# Patient Record
Sex: FEMALE | Race: WHITE
[De-identification: names, ages, dates, MRNs, and addresses within clinical notes are randomized per-mention and may not be internally consistent; named-entity substitution may affect disease eponyms.]

---

## 2017-04-13 ENCOUNTER — HOSPITAL ENCOUNTER (OUTPATIENT)
Dept: HOSPITAL 58 - LAB | Age: 62
Discharge: HOME | End: 2017-04-13
Attending: INTERNAL MEDICINE

## 2017-04-13 VITALS — BODY MASS INDEX: 19.3 KG/M2

## 2017-04-13 DIAGNOSIS — I10: ICD-10-CM

## 2017-04-13 DIAGNOSIS — E11.9: ICD-10-CM

## 2017-04-13 DIAGNOSIS — J44.9: ICD-10-CM

## 2017-04-13 DIAGNOSIS — I50.9: Primary | ICD-10-CM

## 2017-04-13 LAB
ALBUMIN SERPL-MCNC: 4.1 G/DL (ref 3.4–5)
ALBUMIN/GLOB SERPL: 1.37 {RATIO}
ALP SERPL-CCNC: 42 U/L (ref 53–141)
ALT SERPL-CCNC: 18 U/L (ref 12–78)
ANION GAP SERPL CALC-SCNC: 16.7 MMOL/L
AST SERPL-CCNC: 18 U/L (ref 15–37)
BASOPHILS # BLD AUTO: 0.1 K/UL (ref 0–0.2)
BASOPHILS NFR BLD AUTO: 0.8 % (ref 0–3)
BILIRUB SERPL-MCNC: < 0.1 MG/DL (ref 0–1.2)
BUN SERPL-MCNC: 32 MG/DL (ref 7–18)
BUN/CREAT SERPL: 17.48
CALCIUM SERPL-MCNC: 10.3 MG/DL (ref 8.2–10.2)
CHLORIDE SERPL-SCNC: 99 MMOL/L (ref 98–107)
CHOLEST SERPL-MCNC: 206 MG/DL (ref 0–200)
CHOLEST/HDLC SERPL: 3.8 {RATIO} (ref 4.5–5.5)
CO2 BLD-SCNC: 26 MMOL/L (ref 23–31)
CREAT SERPL-MCNC: 1.83 MG/DL (ref 0.6–1.3)
DIGOXIN SERPL-MCNC: 2.02 NG/ML (ref 1–2)
EOSINOPHIL # BLD AUTO: 0.5 K/UL (ref 0–0.7)
EOSINOPHIL NFR BLD AUTO: 7.7 % (ref 0–7)
GFR SERPLBLD BASED ON 1.73 SQ M-ARVRAT: 28 ML/MIN
GLOBULIN SER CALC-MCNC: 3 G/L
GLUCOSE SERPL-MCNC: 83 MG/DL (ref 82–115)
HCT VFR BLD AUTO: 37.8 % (ref 37–47)
HDLC SERPL-MCNC: 54 MG/DL (ref 35–80)
HGB BLD-MCNC: 12.4 G/DL (ref 12–16)
IMM GRANULOCYTES NFR BLD AUTO: 0.3 % (ref 0–5)
LYMPHOCYTES # SPEC AUTO: 2 K/UL (ref 0.6–3.4)
LYMPHOCYTES NFR BLD AUTO: 31.2 % (ref 10–50)
MCH RBC QN: 31.2 PG (ref 27–31)
MCHC RBC AUTO-ENTMCNC: 32.8 G/DL (ref 31.8–35.4)
MCV RBC: 95.2 FL (ref 81–99)
MONOCYTES # BLD AUTO: 0.5 K/UL (ref 0.4–2)
MONOCYTES NFR BLD AUTO: 8.3 % (ref 0–10)
NEUTROPHILS # BLD AUTO: 3.2 K/UL (ref 2–6.9)
NEUTROPHILS NFR BLD AUTO: 51.7 %
PLATELET # BLD AUTO: 260 10^3/UL (ref 140–440)
POTASSIUM SERPL-SCNC: 4.7 MMOL/L (ref 3.5–5.1)
PROT SERPL-MCNC: 7.1 G/DL (ref 5.8–8.1)
RBC # BLD AUTO: 3.97 10^6/UL (ref 4.2–5.4)
SODIUM SERPL-SCNC: 137 MMOL/L (ref 136–145)
TRIGL SERPL-MCNC: 115 MG/DL (ref 30–150)
VITAMIN D25: 53 NG/ML (ref 20–50)
VLDLC SERPL CALC-MCNC: 23 MG/DL (ref 2–30)
WBC # BLD AUTO: 6.26 K/UL (ref 4.6–10.2)

## 2017-04-13 PROCEDURE — 82607 VITAMIN B-12: CPT

## 2017-04-13 PROCEDURE — 83036 HEMOGLOBIN GLYCOSYLATED A1C: CPT

## 2017-04-13 PROCEDURE — 85025 COMPLETE CBC W/AUTO DIFF WBC: CPT

## 2017-04-13 PROCEDURE — 36415 COLL VENOUS BLD VENIPUNCTURE: CPT

## 2017-04-13 PROCEDURE — 82306 VITAMIN D 25 HYDROXY: CPT

## 2017-04-13 PROCEDURE — 84443 ASSAY THYROID STIM HORMONE: CPT

## 2017-04-13 PROCEDURE — 80162 ASSAY OF DIGOXIN TOTAL: CPT

## 2017-04-13 PROCEDURE — 80061 LIPID PANEL: CPT

## 2017-04-13 PROCEDURE — 80053 COMPREHEN METABOLIC PANEL: CPT

## 2017-05-30 ENCOUNTER — HOSPITAL ENCOUNTER (OUTPATIENT)
Dept: HOSPITAL 58 - RAD | Age: 62
Discharge: HOME | End: 2017-05-30
Attending: ADVANCED PRACTICE MIDWIFE

## 2017-05-30 VITALS — BODY MASS INDEX: 19.3 KG/M2

## 2017-05-30 DIAGNOSIS — Z12.31: Primary | ICD-10-CM

## 2017-05-31 NOTE — MAMMO
EXAM:  Digital screening mammogram 

  

HISTORY:  Screening 

  

COMPARISON:  03/17/2014 

  

FINDINGS:  Digital  MLO and CC views of the right and left breast were performed.  There are scatter
ed fibroglandular densities.  There is a prominent left axillary lymph node.  There are postsurgical
 changes in the right breast.  There is no evidence for mass, asymmetry, distortion, or suspicious c
alcifications in the right either breast. 

  

IMPRESSION: 

1.  Prominent left axillary lymph node.  Correlation with ultrasound recommended 

2.  No mammographic evidence of malignancy in the right breast. 

  

BIRADS category 0, incomplete

## 2017-06-16 ENCOUNTER — HOSPITAL ENCOUNTER (OUTPATIENT)
Dept: HOSPITAL 58 - RAD | Age: 62
Discharge: HOME | End: 2017-06-16
Attending: ADVANCED PRACTICE MIDWIFE
Payer: COMMERCIAL

## 2017-06-16 VITALS — BODY MASS INDEX: 19.3 KG/M2

## 2017-06-16 DIAGNOSIS — R92.8: Primary | ICD-10-CM

## 2017-06-16 NOTE — US
EXAM:  Left breast ultrasound. 

  

History:  Left axillary mass. 

  

Comparison:  Bilateral mammogram 05/30/2017 

  

Technique:  Multiple sonographic images through the left breast and axilla were obtained. Color dupl
ex Doppler was used to interrogate vascular flow. 

  

Findings:  There are two benign lymph nodes within the left axilla demonstrating fatty hilum with th
e largest measuring 0.9 cm in short axis diameter. These correlate with mammography.  No suspicious 
masses. 

  

Impression:  Benign left axillary lymph nodes.  Return to routine screening mammography schedule. 

  

BIRADS 2

## 2017-07-13 ENCOUNTER — HOSPITAL ENCOUNTER (OUTPATIENT)
Dept: HOSPITAL 58 - LAB | Age: 62
Discharge: HOME | End: 2017-07-13
Attending: INTERNAL MEDICINE

## 2017-07-13 VITALS — BODY MASS INDEX: 19.3 KG/M2

## 2017-07-13 DIAGNOSIS — I10: ICD-10-CM

## 2017-07-13 DIAGNOSIS — I50.32: Primary | ICD-10-CM

## 2017-07-13 DIAGNOSIS — E11.9: ICD-10-CM

## 2017-07-13 LAB
ALBUMIN SERPL-MCNC: 4.2 G/DL (ref 3.4–5)
ALBUMIN/GLOB SERPL: 1.35 {RATIO}
ALP SERPL-CCNC: 50 U/L (ref 53–141)
ALT SERPL-CCNC: 14 U/L (ref 12–78)
ANION GAP SERPL CALC-SCNC: 19.7 MMOL/L
AST SERPL-CCNC: 15 U/L (ref 15–37)
BASOPHILS # BLD AUTO: 0.1 K/UL (ref 0–0.2)
BASOPHILS NFR BLD AUTO: 1.1 % (ref 0–3)
BILIRUB SERPL-MCNC: 0.21 MG/DL (ref 0–1.2)
BUN SERPL-MCNC: 29 MG/DL (ref 7–18)
BUN/CREAT SERPL: 17.15
CALCIUM SERPL-MCNC: 10.3 MG/DL (ref 8.2–10.2)
CHLORIDE SERPL-SCNC: 101 MMOL/L (ref 98–107)
CHOLEST SERPL-MCNC: 170 MG/DL (ref 0–200)
CHOLEST/HDLC SERPL: 3.7 {RATIO} (ref 4.5–5.5)
CO2 BLD-SCNC: 24 MMOL/L (ref 23–31)
CREAT SERPL-MCNC: 1.69 MG/DL (ref 0.6–1.3)
DIGOXIN SERPL-MCNC: 2.01 NG/ML (ref 1–2)
EOSINOPHIL # BLD AUTO: 0.4 K/UL (ref 0–0.7)
EOSINOPHIL NFR BLD AUTO: 6.6 % (ref 0–7)
GFR SERPLBLD BASED ON 1.73 SQ M-ARVRAT: 31 ML/MIN
GLOBULIN SER CALC-MCNC: 3.1 G/L
GLUCOSE SERPL-MCNC: 78 MG/DL (ref 82–115)
HCT VFR BLD AUTO: 42.2 % (ref 37–47)
HDLC SERPL-MCNC: 46 MG/DL (ref 35–80)
HGB BLD-MCNC: 14 G/DL (ref 12–16)
IMM GRANULOCYTES NFR BLD AUTO: 0.5 % (ref 0–5)
LYMPHOCYTES # SPEC AUTO: 1.5 K/UL (ref 0.6–3.4)
LYMPHOCYTES NFR BLD AUTO: 27.1 % (ref 10–50)
MCH RBC QN: 31 PG (ref 27–31)
MCHC RBC AUTO-ENTMCNC: 33.2 G/DL (ref 31.8–35.4)
MCV RBC: 93.4 FL (ref 81–99)
MONOCYTES # BLD AUTO: 0.4 K/UL (ref 0.4–2)
MONOCYTES NFR BLD AUTO: 7.5 % (ref 0–10)
NEUTROPHILS # BLD AUTO: 3.2 K/UL (ref 2–6.9)
NEUTROPHILS NFR BLD AUTO: 57.2 %
PLATELET # BLD AUTO: 291 10^3/UL (ref 140–440)
POTASSIUM SERPL-SCNC: 4.7 MMOL/L (ref 3.5–5.1)
PROT SERPL-MCNC: 7.3 G/DL (ref 5.8–8.1)
RBC # BLD AUTO: 4.52 10^6/UL (ref 4.2–5.4)
SODIUM SERPL-SCNC: 140 MMOL/L (ref 136–145)
TRIGL SERPL-MCNC: 131 MG/DL (ref 30–150)
VLDLC SERPL CALC-MCNC: 26 MG/DL (ref 2–30)
WBC # BLD AUTO: 5.58 K/UL (ref 4.6–10.2)

## 2017-07-13 PROCEDURE — 93005 ELECTROCARDIOGRAM TRACING: CPT

## 2017-07-13 PROCEDURE — 80162 ASSAY OF DIGOXIN TOTAL: CPT

## 2017-07-13 PROCEDURE — 84443 ASSAY THYROID STIM HORMONE: CPT

## 2017-07-13 PROCEDURE — 80053 COMPREHEN METABOLIC PANEL: CPT

## 2017-07-13 PROCEDURE — 83036 HEMOGLOBIN GLYCOSYLATED A1C: CPT

## 2017-07-13 PROCEDURE — 80061 LIPID PANEL: CPT

## 2017-07-13 PROCEDURE — 36415 COLL VENOUS BLD VENIPUNCTURE: CPT

## 2017-07-13 PROCEDURE — 85025 COMPLETE CBC W/AUTO DIFF WBC: CPT

## 2017-07-13 PROCEDURE — 93010 ELECTROCARDIOGRAM REPORT: CPT

## 2017-07-31 ENCOUNTER — HOSPITAL ENCOUNTER (OUTPATIENT)
Dept: HOSPITAL 58 - CAR | Age: 62
Discharge: HOME | End: 2017-07-31
Attending: INTERNAL MEDICINE

## 2017-07-31 VITALS — BODY MASS INDEX: 19.3 KG/M2

## 2017-07-31 DIAGNOSIS — I10: ICD-10-CM

## 2017-07-31 DIAGNOSIS — R06.02: Primary | ICD-10-CM

## 2017-07-31 DIAGNOSIS — I50.32: ICD-10-CM

## 2017-07-31 DIAGNOSIS — E11.9: ICD-10-CM

## 2017-07-31 NOTE — NM
EXAM: Myocardial perfusion imaging 

  

HISTORY: Shortness of breath 

  

COMPARISON: None. 

  

TECHNIQUE: Patient was injected 4 mCi of thallium 201 chloride intravenously while at rest.  SPECT i
maging of the heart was acquired.  Patient was stressed using dobutamine protocol and injected 24.8 
mCi of Tc99m Sestamibi intravenously.  Another SPECT imaging of the heart was then performed.  Gated
 cardiac study was acquired. 

  

FINDINGS: Post stress images show normal left ventricular cavity size.  Small focal fixed defect is 
seen involving left ventricle apex.  Myocardial perfusion is otherwise homogeneous throughout and th
ere is no evidence of reversible ischemia.  Left ventricular ejection fraction is 76% and wall motio
n is normal. 

  

IMPRESSION: 

1.  SPECT myocardial perfusion imaging shows no evidence of reversible ischemia. 

2.  Small focal fixed defect left ventricle apex. 

3.  Normal left ventricular ejection fraction and normal wall motion

## 2017-09-27 ENCOUNTER — HOSPITAL ENCOUNTER (OUTPATIENT)
Dept: HOSPITAL 58 - RAD | Age: 62
Discharge: HOME | End: 2017-09-27
Attending: INTERNAL MEDICINE

## 2017-09-27 VITALS — BODY MASS INDEX: 19.3 KG/M2

## 2017-09-27 DIAGNOSIS — M25.511: ICD-10-CM

## 2017-09-27 DIAGNOSIS — M25.512: Primary | ICD-10-CM

## 2017-09-27 NOTE — DI
EXAM:  LEFT SHOULDER 

  

HISTORY:  Left shoulder pain 

  

FINDINGS:  Left shoulder three-view. Bone and joint structures are within normal limits.  There is no
 joint dislocation or fracture identified.  Bone density and soft tissues are unremarkable. 

  

IMPRESSION:  Within normal limits.

## 2017-09-27 NOTE — DI
EXAM:  Right shoulder three views 

  

HISTORY:  Pain in right shoulder 

  

COMPARISON:  08/25/2014 

  

FINDINGS:  The bones are normal. The glenohumeral joint and acromioclavicular joint are normal. No fo
momo soft tissue abnormality. Granulomatous calcification right chest. 

  

IMPERSSION:  Normal examination right shoulder.

## 2017-12-05 NOTE — RS.[email or web address]
Date of Note: 12/04/17


Visit #: 1


Date of Evaluation: 12/04/17


Payer Source: Medicaid


Surgery Performed?: No


Treatment Diagnosis: tendinopathy of B rotator cuffs, pain in B shlds


History of Condition/Mechanism of Injury:: pt states her shlds have been 

hurting for approx 1 year. No specific date of injury.


Prior Level of Function.....Patient was independent with: ADL's, Self Care, 

Ambulation/Mobility, Community Integration/Access


Functional Limitations: Sleep, ADL's, Reaching, Pulling, Lifting


Current Subjective/complaints:: pt states she has been on disability since 11/ 2016 due to COPD and heart problems.  pt states her shlds have been hurting 

since that time and have gotten progressively worse. 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 pt states she walks daily for exercise.


Treatment Side (optional): Bilateral


*Precautions: n/a





Medical History


Medical History: COPD, Diabetes, Arthritis


Medical History Comments:: "heart problems", osteoporosis


Smoking Status: Former smoker


Diagnostic Testing/Imaging:: [facility]


Hx Home Medications: furosemide, metformin, proair HFA, clorazepate, digoxin, 

ipratropi/alb, lovastatin, meloxicam, diltiazem, chlordiazepoxide, gabapentin, 

potassium, gemfibrozil


Patient's Goals: decrease shld pain





Pain Assessment





- Pain Description


Pain Location: B shlds


Pain Description: Sharp, Aching


Pain Description: aching most of the time, sharp pain with movement


Current Pain Intensity: 9/10


Worst Pain Intensity: 10/10





Functional Outcome Measure


UE Functional Index: 31 (61%)





- G Codes & Severity Modifier


G Codes & Modifier: n/a


Source of G Code score: n/a





Observation





- Observation


Posture: Forward Head, Rounded Shoulders, Increased Thoracic Kyphosis


Handedness: Right





Gait





- Gait Pattern


General Gait Pattern Observation: No Deviations/Normal





General


Range of Motion: BLE WFL's.  BUE elbow, wrist and hand WFL's


Muscle Strength: BLE 5/5.  BUE elbow flex/ext 4-/5,





- Left Shoulder ROM


Left Shoulder Flexion: 115 (AAROM)


Left Shoulder Abduction: 75 (AAROM)


Left Shoulder External Rotation: 30 (AAROM)


Left Shoulder ROM Limitations: Soft Tissue Tightness, Muscle Weakness, Pain





- Right Shoulder ROM


Right Shoulder Flexion: 118 (AAROM)


Right Shoulder Abduction: 62 (AAROM)


Right Shoulder External Rotation: 40 (AAROM)


Right Shoulder ROM Limitations: Soft Tissue Tightness, Muscle Weakness, Pain





- Left Shoulder Strength


Left Shoulder Flexion: 3-   Fair-


Left Shoulder Extension: 3    Fair


Left Shoulder Abduction: 3-   Fair-


Left Shoulder External Rotation: 2+   Poor+


Left Shoulder Internal Rotation: 3    Fair





- Right Shoulder Strength


Right Shoulder Flexion: 3-   Fair-


Right Shoulder Extension: 3    Fair


Right Shoulder Abduction: 2+   Poor+


Right Shoulder Adduction: 3    Fair


Right Shoulder External Rotation: 2+   Poor+


Right Shoulder Internal Rotation: 3    Fair





- Special Tests


Shoulder Empty Can (Supraspinatus) Test: Positive Left, Positive Right


Shoulder Yergason's Test: Negative Left, Negative Right


Shoulder Jimenez-Laureano Impingement Test: Negative Left, Negative Right





Palpation


Comments:: no trigger points noted, pain more diffuse





Sensation





- Sensation


Right Upper Extremity: Intact/Normal


Left Upper Extremity: Intact/Normal


Right Lower Extremity: Intact/Normal


Left Lower Extremity: Intact/Normal





Balance





- Sitting Balance


Static Sitting Balance: Normal


Dynamic Sitting Balance: Normal





- Standing Balance


Static Standing Balance: Normal


Dynamic Standing Balance: Normal





- Heat/Cryotherapy


Treatment: Cryotherapy


Comments:: B shlds





Interventions





- Exercise/Activities/Manual Therapy


Exercises/Activities: pt performed isometric shld flex, abd, add x 2-3 reps BUE

, scapular retraction, shld shrugs, pendulum ex.


Manual Therapy: n/a


HOME EXERCISE PROGRAM: pt given written HEP including: pendulum ex, scapular 

retraction, shld shrugs, isometric shld flex, abd, add





- Charges


Timed Code Treatment Minutes: 52


Total Treatment Time: 52


Procedures billed for this date of service:: eval med, cold packs





Assessment


Assessment: pt presents with decreased strength, ROM Bshlds as well as pain B 

shlds which is limiting ability to perform ADL's and limiting sleep.


Patient Education: Education of diagnosis, Home Exercise Program, Activity 

Modification, Education of Plan of Care


Rehab Potential: Good





Short Term Goals


Goal #1: pt rate pain <8/10 with activity


Goal to be met by: 12/18/17


Goal #2: Increased ROM L shld flex 118, abd 80, ER 35 Rshld flex 120 abd 70 ER 

42


Goal to be met by: 12/18/17


Goal #3: pt able to don/doff jacket without assistance 


Goal to be met by: 12/18/17





Long Term Goals


Goal #1: pt rate pain <6/10 B shlds


Goal to be met by: 01/03/18


Goal #2: Increase ROM B shlds WFL's to allow for independence with ADL's


Goal to be met by: 01/03/18


Goal #3: pt increased strength BUE 4- to 4/5 and independent with HEP.


Goal to be met by: 01/03/18


Goal #4: Improvement on UE functional index to > 50


Goal to be met by: 01/03/18





Plan





- Treatment to be Provided


Procedures: Therapeutic Exercises, Therapeutic Activity, Neuromuscular Rehab, 

Manual Therapy, Patient Education


Modalities: Electrical Stimulation, Ultrasound/Phonophoresis, Class IV Laser, 

Cryotherapy, Hot Packs





- Treatment Plan


Frequency: 3 X week


Duration: 4 weeks


ORDER # VISITS AND/OR THROUGH DATE: 1/5/17





- Treatment Code


(1) Tendinopathy of left rotator cuff


Code(s): M67.912 - UNSPECIFIED DISORDER OF SYNOVIUM AND TENDON, LEFT SHOULDER   





(2) Tendinopathy of right rotator cuff


Code(s): M67.911 - UNSPECIFIED DISORDER OF SYNOVIUM AND TENDON, RIGHT SHOULDER 

  





(3) Pain in joint, shoulder region


Code(s): M25.519 - PAIN IN UNSPECIFIED SHOULDER   


Qualifiers: 


   Laterality: bilateral   Qualified Code(s): M25.511 - Pain in right shoulder; 

M25.512 - Pain in left shoulder; M25.512 - Pain in left shoulder

## 2017-12-08 NOTE — RS.OPPTDN
Subjective


Date of Note: 12/08/17


Visit #: 2


Date of Evaluation: 12/04/17


Payer Source: Medicaid


Treatment Diagnosis: tendinopathy of B rotator cuffs, pain in B shlds


Current Subjective/complaints:: Patient reports she is working on HEP. Reports 

US helped reduce pain today. Reports she has difficulty with light ADL's, 

including donning/doffing shirt.


*Precautions: n/a





Pain Assessment





- Pain Description


Pain Location: Bilateral shoulder joints


Pain Description: Dull, Aching


Current Pain Intensity: right mod and left mod+ 


Other Comments regarding Pain:: Pain increases with activity





- Treatment


Modality: Ultrasound


Parameters/Method Applied: [id number] total. 5 mins to each shoulder joint line at 

1.5w/cm2 prior to EX.


Patient Position: Sitting





- Heat/Cryotherapy


Treatment: Cryotherapy ([id number] Ended with CP to bilateral shoulder joint. 

Patient in sitting. )





Interventions





- Exercise/Activities/Manual Therapy


Exercises/Activities: AAROM bilateral shoulder joints. Isometric shoulder 

adduction and extension in neutral with pillow. Scapular retraction and shldr 

shrugs. Began isometric cervical retraction and lateral flexion stretching for 

postural correction to improve pain.


Total minutes of Exercise: 15mins 


Manual Therapy: n/a


HOME EXERCISE PROGRAM: pt given written HEP including: pendulum ex, scapular 

retraction, shld shrugs, isometric shld flex, abd, add, isometric cervical 

retraction





- Charges


Timed Code Treatment Minutes: 25mins 


Total Treatment Time: 40mins 


Procedures billed for this date of service:: US, EX, CP


Assessment: Patient responding to modalities and appears to be consistent with 

HEP.


Patient Education: Education of diagnosis, Body/Joint mechanics, Home Exercise 

Program, Activity Modification


Patient demonstrates compliance with HEP?: Yes





Short Term Goals


Goal #1: pt rate pain <8/10 with activity


Goal to be met by: 12/18/17


Goal #2: Increased ROM L shld flex 118, abd 80, ER 35 Rshld flex 120 abd 70 ER 

42


Goal to be met by: 12/18/17


Goal #3: pt able to don/doff jacket without assistance 


Goal to be met by: 12/18/17





Long Term Goals


Goal #1: pt rate pain <6/10 B shlds


Goal to be met by: 01/03/18


Goal #2: Increase ROM B shlds WFL's to allow for independence with ADL's


Goal to be met by: 01/03/18


Goal #3: pt increased strength BUE 4- to 4/5 and independent with HEP.


Goal to be met by: 01/03/18


Goal #4: Improvement on UE functional index to > 50


Goal to be met by: 01/03/18





Plan


PLAN OF CARE EXPIRES ON:: 01/03/18


ORDER # VISITS AND/OR THROUGH DATE: 1/5/17


PLAN: Continue modalities and progress exercise to reduce pain and increase 

functional activity level.

## 2017-12-12 NOTE — RS.OPPTDN
Subjective


Date of Note: 12/12/17


Visit #: 3


Date of Evaluation: 12/04/17


Payer Source: Medicaid


Treatment Diagnosis: tendinopathy of B rotator cuffs, pain in B shlds


Current Subjective/complaints:: Patient reports improvement in pain in the 

bilateral shoulders. States she is able to do more light activities at home, 

including dressing which she needed help with prior to therapy. States she is 

consistently working on HEP.


*Precautions: n/a





Pain Assessment





- Pain Description


Pain Location: Bilateral shoulder joint and right upper extremity


Pain Description: left shoulder 4-5/10, right 2-3/10, no pain in right UE at 

present


Other Comments regarding Pain:: Reports left shoulder pain is worse than right, 

even though she has occasionally has radicular pain into right UE.





- Treatment


Modality: Ultrasound


Parameters/Method Applied: [id number] at 1.5w/cm2 to each shoulder joint x5mins 

prior to EX.


Patient Position: Sitting





- Heat/Cryotherapy


Treatment: Hot Pack ([id number] to bilateral shoulder joints prior to US and EX. 

Patient in sitting. )





Interventions





- Exercise/Activities/Manual Therapy


Exercises/Activities: AAROM bilateral shoulder joints. Isometric shoulder 

adduction and extension in neutral. Scapular retraction and shldr shrugs. 

Isometric cervical retraction with manual resistance. Began scapular retraction 

with green theraband and bilateral shoulder ER with yellow theraband.


Total minutes of Exercise: 15mins 


Manual Therapy: n/a


HOME EXERCISE PROGRAM: pt given written HEP including: pendulum ex, scapular 

retraction, shld shrugs, isometric shld flex, abd, add, isometric cervical 

retraction, green theraband for scapular retraction, yellow theraband for 

bilateral shoulder ER





- Charges


Timed Code Treatment Minutes: 25mins 


Total Treatment Time: 50mins 


Procedures billed for this date of service:: HP, US, EX


Assessment: Patient responding well to treatment and reporting a reduction in 

pain and an increase in functional activity level.


Patient Education: Education of diagnosis, Body/Joint mechanics, Home Exercise 

Program


Comments: Patient education of need to work on postural strengthening.


Patient demonstrates compliance with HEP?: Yes





Short Term Goals


Goal #1: pt rate pain <8/10 with activity


Goal to be met by: 12/18/17


Progress towards Goal:: Met


Goal #2: Increased ROM L shld flex 118, abd 80, ER 35 Rshld flex 120 abd 70 ER 

42


Goal to be met by: 12/18/17


Progress towards Goal:: Progressing


Goal #3: pt able to don/doff jacket without assistance 


Goal to be met by: 12/18/17


Progress towards Goal:: Met





Long Term Goals


Goal #1: pt rate pain <6/10 B shlds


Goal to be met by: 01/03/18


Progress towards goal: Met


Goal #2: Increase ROM B shlds WFL's to allow for independence with ADL's


Goal to be met by: 01/03/18


Progress towards goal: Progressing


Goal #3: pt increased strength BUE 4- to 4/5 and independent with HEP.


Goal to be met by: 01/03/18


Goal #4: Improvement on UE functional index to > 50


Goal to be met by: 01/03/18





Plan


PLAN OF CARE EXPIRES ON:: 01/05/18


ORDER # VISITS AND/OR THROUGH DATE: 1/5/17


PLAN: Continue moidalities and progressive exercise to reduce pain and increase 

functional activity level.

## 2017-12-15 NOTE — RS.OPPTDN
Subjective


Date of Note: 12/15/17


Visit #: 4


Date of Evaluation: 12/04/17


Payer Source: Medicaid


Treatment Diagnosis: tendinopathy of B rotator cuffs, pain in B shlds


Current Subjective/complaints:: Patient reports she is able to dress 

independently now. 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 Reports decreased pain and increased 

active left shoulder flexion following stretching and trigger point release.


*Precautions: n/a





Pain Assessment





- Pain Description


Pain Location: Bilateral shoulder joints, right upper arm


Current Pain Intensity: Left shoulder mod, right shoulder mild to mod


Other Comments regarding Pain:: Denies discomfort into the right upper 

extremity today.





- Treatment


Modality: Ultrasound


Parameters/Method Applied: 12mins total at 1.5w/cm2. 5 mins to the right 

shoulder shoulder joint and 7mins to the left with focus at posterior joint 

prior to EX.


Patient Position: Sitting





- Heat/Cryotherapy


Treatment: Hot Pack





Interventions





- Exercise/Activities/Manual Therapy


Exercises/Activities: PROM and AAROM bilateral shoulder joints. Isometric 

shoulder adduction and extension in neutral. Scapular retraction and shoulder 

shrugs.


Total minutes of Exercise: 12mins 


Manual Therapy: Trigger point release to the left posterior shoulder joint 

along the infraspinatus.


Total minutes of Manual Therapy: 3mins 


HOME EXERCISE PROGRAM: pt given written HEP including: pendulum ex, scapular 

retraction, shld shrugs, isometric shld flex, abd, add, isometric cervical 

retraction, green theraband for scapular retraction, yellow theraband for 

bilateral shoulder ER





- Objective Findings


Observations,measurements,etc.: Patient able to demo right shoulder flexion MMT 

at 4/5 and left shoulder flexion 4-/5, tested following manaul therapy for 

trigger point release left shoulder.





- Charges


Timed Code Treatment Minutes: 27mins 


Total Treatment Time: 50mins 


Procedures billed for this date of service:: HP, US, EX


Assessment: Patient benefitting from treatment and progressing with functional 

activities. Manual therapy significantly improved pain and AROM of the left 

shoulder today.


Patient Education: Body/Joint mechanics, Home Exercise Program, Home Safety, 

Activity Modification


Patient demonstrates compliance with HEP?: Yes





Short Term Goals


Goal #1: pt rate pain <8/10 with activity


Goal to be met by: 12/18/17


Progress towards Goal:: Met


Goal #2: Increased ROM L shld flex 118, abd 80, ER 35 Rshld flex 120 abd 70 ER 

42


Goal to be met by: 12/18/17


Progress towards Goal:: Progressing


Goal #3: pt able to don/doff jacket without assistance 


Goal to be met by: 12/18/17


Progress towards Goal:: Met





Long Term Goals


Goal #1: pt rate pain <6/10 B shlds


Goal to be met by: 01/03/18


Progress towards goal: Met


Goal #2: Increase ROM B shlds WFL's to allow for independence with ADL's


Goal to be met by: 01/03/18


Progress towards goal: Progressing


Goal #3: pt increased strength BUE 4- to 4/5 and independent with HEP.


Goal to be met by: 01/03/18


Progress towards goal: Progressing


Goal #4: Improvement on UE functional index to > 50


Goal to be met by: 01/03/18





Plan


PLAN OF CARE EXPIRES ON:: 01/05/18


ORDER # VISITS AND/OR THROUGH DATE: 1/5/17


PLAN: Progress exercise to increase ROM and functional activity level.

## 2017-12-18 NOTE — RS.OPPTDN
Subjective


Date of Note: 12/18/17


Visit #: 5


Date of Evaluation: 12/04/17


Payer Source: Medicaid


Treatment Diagnosis: tendinopathy of B rotator cuffs, pain in B shlds


Current Subjective/complaints:: Patient reports continued discomfort in the 

left shoulder joint, but states she is much better overall. States she can don/

doff all shirts and coats, which she needed help with before.


*Precautions: n/a





Pain Assessment





- Pain Description


Pain Location: Bilateral shoulder joints.


Pain Description: Dull, Aching


Current Pain Intensity: Left mild to mod, right mild and no pain at rest. 





- Treatment


Modality: Ultrasound


Parameters/Method Applied: 10mins total. 5mins to each shoulder joint at 1.5w/

cm2 prior to EX. Patient in sitting.


Patient Position: Sitting





- Heat/Cryotherapy


Treatment: Hot Pack ([id number] to bilateral shoulder joints prior to US. Patient 

in sitting. )





Interventions





- Exercise/Activities/Manual Therapy


Exercises/Activities: PROM and AAROM bilateral shoulder joints. Isometric 

shoulder adduction and extension in neutral. Scapular retraction and shoulder 

shrugs. Red theraband for scapular retraction 2s/10reps. Began cuff series with 

1# dumbell into flexion and scaption, bilaterally. Active reaching and wall 

walking.


Total minutes of Exercise: 20mins 


Manual Therapy: [id number] Trigger point release to the left posterior shoulder 

joint along the infraspinatus, traps, and the mid scapula.


Total minutes of Manual Therapy: 10mins 


HOME EXERCISE PROGRAM: pt given written HEP including: pendulum ex, scapular 

retraction, shld shrugs, isometric shld flex, abd, add, isometric cervical 

retraction, green theraband for scapular retraction, yellow theraband for 

bilateral shoulder ER





- Charges


Timed Code Treatment Minutes: 40mins 


Total Treatment Time: 55mins 


Procedures billed for this date of service:: HP, US, MT, EX


Assessment: Patient has responded well to treatment and has reported 

improvement in functional activity level.


Patient Education: Home Exercise Program, Home Safety, Activity Modification, 

Education of Plan of Care


Comments: Discussed that we have not received approval from insurance company. 

Will hold treatment after next session which will be 6th visit. Patient will 

need to continue HEP to maintain current progress.


Patient demonstrates compliance with HEP?: Yes





Short Term Goals


Goal #1: pt rate pain <8/10 with activity


Goal to be met by: 12/18/17


Progress towards Goal:: Met


Goal #2: Increased ROM L shld flex 118, abd 80, ER 35 Rshld flex 120 abd 70 ER 

42


Goal to be met by: 12/18/17


Progress towards Goal:: Progressing


Goal #3: pt able to don/doff jacket without assistance 


Goal to be met by: 12/18/17


Progress towards Goal:: Met





Long Term Goals


Goal #1: pt rate pain <6/10 B shlds


Goal to be met by: 01/03/18


Progress towards goal: Met


Goal #2: Increase ROM B shlds WFL's to allow for independence with ADL's


Goal to be met by: 01/03/18


Progress towards goal: Progressing


Goal #3: pt increased strength BUE 4- to 4/5 and independent with HEP.


Goal to be met by: 01/03/18


Progress towards goal: Progressing


Goal #4: Improvement on UE functional index to > 50


Goal to be met by: 01/03/18





Plan


PLAN OF CARE EXPIRES ON:: 01/05/18


ORDER # VISITS AND/OR THROUGH DATE: 1/5/17 (Correction 1/5/18)


PLAN: Continue this week, progressing functional use of the UE's. Will hold 

treatment if approval not received.

## 2017-12-19 ENCOUNTER — HOSPITAL ENCOUNTER (OUTPATIENT)
Dept: HOSPITAL 58 - LAB | Age: 62
Discharge: HOME | End: 2017-12-19
Attending: INTERNAL MEDICINE

## 2017-12-19 VITALS — BODY MASS INDEX: 19.3 KG/M2

## 2017-12-19 DIAGNOSIS — I50.32: Primary | ICD-10-CM

## 2017-12-19 DIAGNOSIS — I10: ICD-10-CM

## 2017-12-19 DIAGNOSIS — E11.9: ICD-10-CM

## 2017-12-19 DIAGNOSIS — J44.9: ICD-10-CM

## 2017-12-19 LAB
ALBUMIN SERPL-MCNC: 3.7 G/DL (ref 3.4–5)
ALBUMIN/GLOB SERPL: 1.03 {RATIO}
ALP SERPL-CCNC: 54 U/L (ref 53–141)
ALT SERPL-CCNC: 10 U/L (ref 12–78)
ANION GAP SERPL CALC-SCNC: 14 MMOL/L
AST SERPL-CCNC: 14 U/L (ref 15–37)
BASOPHILS # BLD AUTO: 0.1 K/UL (ref 0–0.2)
BASOPHILS NFR BLD AUTO: 0.9 % (ref 0–3)
BILIRUB SERPL-MCNC: 0.3 MG/DL (ref 0–1.2)
BUN SERPL-MCNC: 42 MG/DL (ref 7–18)
BUN/CREAT SERPL: 24.13
CALCIUM SERPL-MCNC: 10.5 MG/DL (ref 8.2–10.2)
CHLORIDE SERPL-SCNC: 108 MMOL/L (ref 98–107)
CHOLEST SERPL-MCNC: 187 MG/DL (ref 0–200)
CHOLEST/HDLC SERPL: 4.5 {RATIO} (ref 4.5–5.5)
CO2 BLD-SCNC: 24 MMOL/L (ref 23–31)
CREAT SERPL-MCNC: 1.74 MG/DL (ref 0.6–1.3)
DIGOXIN SERPL-MCNC: 1.76 NG/ML (ref 1–2)
EOSINOPHIL # BLD AUTO: 0.8 K/UL (ref 0–0.7)
EOSINOPHIL NFR BLD AUTO: 9.6 % (ref 0–7)
GFR SERPLBLD BASED ON 1.73 SQ M-ARVRAT: 30 ML/MIN
GLOBULIN SER CALC-MCNC: 3.6 G/L
GLUCOSE SERPL-MCNC: 89 MG/DL (ref 82–115)
HCT VFR BLD AUTO: 39.9 % (ref 37–47)
HDLC SERPL-MCNC: 42 MG/DL (ref 35–80)
HGB BLD-MCNC: 12.9 G/DL (ref 12–16)
IMM GRANULOCYTES NFR BLD AUTO: 1.1 % (ref 0–5)
LYMPHOCYTES # SPEC AUTO: 2 K/UL (ref 0.6–3.4)
LYMPHOCYTES NFR BLD AUTO: 24.6 % (ref 10–50)
MCH RBC QN: 30.1 PG (ref 27–31)
MCHC RBC AUTO-ENTMCNC: 32.3 G/DL (ref 31.8–35.4)
MCV RBC: 93.2 FL (ref 81–99)
MONOCYTES # BLD AUTO: 0.6 K/UL (ref 0.4–2)
MONOCYTES NFR BLD AUTO: 7.1 % (ref 0–10)
NEUTROPHILS # BLD AUTO: 4.6 K/UL (ref 2–6.9)
NEUTROPHILS NFR BLD AUTO: 56.7 %
PLATELET # BLD AUTO: 286 10^3/UL (ref 140–440)
POTASSIUM SERPL-SCNC: 5 MMOL/L (ref 3.5–5.1)
PROT SERPL-MCNC: 7.3 G/DL (ref 5.8–8.1)
RBC # BLD AUTO: 4.28 10^6/UL (ref 4.2–5.4)
SODIUM SERPL-SCNC: 141 MMOL/L (ref 136–145)
TRIGL SERPL-MCNC: 171 MG/DL (ref 30–150)
VLDLC SERPL CALC-MCNC: 34 MG/DL (ref 2–30)
WBC # BLD AUTO: 8.12 K/UL (ref 4.6–10.2)

## 2017-12-19 PROCEDURE — 85025 COMPLETE CBC W/AUTO DIFF WBC: CPT

## 2017-12-19 PROCEDURE — 36415 COLL VENOUS BLD VENIPUNCTURE: CPT

## 2017-12-19 PROCEDURE — 80053 COMPREHEN METABOLIC PANEL: CPT

## 2017-12-19 PROCEDURE — 83036 HEMOGLOBIN GLYCOSYLATED A1C: CPT

## 2017-12-19 PROCEDURE — 80162 ASSAY OF DIGOXIN TOTAL: CPT

## 2017-12-19 PROCEDURE — 84443 ASSAY THYROID STIM HORMONE: CPT

## 2017-12-19 PROCEDURE — 80061 LIPID PANEL: CPT

## 2017-12-20 ENCOUNTER — HOSPITAL ENCOUNTER (OUTPATIENT)
Dept: HOSPITAL 58 - REHAB | Age: 62
LOS: 11 days | End: 2017-12-31
Attending: ORTHOPAEDIC SURGERY

## 2017-12-20 VITALS — BODY MASS INDEX: 19.3 KG/M2

## 2017-12-20 DIAGNOSIS — M25.512: ICD-10-CM

## 2017-12-20 DIAGNOSIS — M67.911: ICD-10-CM

## 2017-12-20 DIAGNOSIS — M67.912: Primary | ICD-10-CM

## 2017-12-20 DIAGNOSIS — M25.511: ICD-10-CM

## 2017-12-20 NOTE — RS.OPPTDC
Date of Discharge: 12/20/17


Date of Evaluation: 12/04/17


Number of Visits: 6


Treatment Diagnosis: tendinopathy of B rotator cuffs, pain in B shlds


Current Complaints/Gains: Pleased with her progress,agrees with D/C plans today.





Pain Assessment





- Pain Description


Current Pain Intensity: not rated 


Other Comments regarding Pain:: L shoulder generally hurts more than the R





Functional Outcome Measure


UE Functional Index: 38





- G Codes & Severity Modifier


G Codes & Modifier: NA


Source of G Code score: NA





Observation





- Observation


Posture: Forward Head, Rounded Shoulders





- Treatment


Modality: Ultrasound


Parameters/Method Applied: 10 mins. to each shoulder ,@ 1.5 w/cm2


Patient Position: Sitting





- Heat/Cryotherapy


Treatment: Hot Pack (20 mins. to both shoulders , prior to US )





Interventions





- Exercise/Activities/Manual Therapy


Exercises/Activities: HEP review during modalities.


Total minutes of Exercise: 0


Manual Therapy: [id number] DTM bilaterally to UT and scapular area


Total minutes of Manual Therapy: 15


HOME EXERCISE PROGRAM: pt given written HEP including: pendulum ex, scapular 

retraction, shld shrugs, isometric shld flex, abd, add, isometric cervical 

retraction, green theraband for scapular retraction, yellow theraband for 

bilateral shoulder ER





- Charges


Timed Code Treatment Minutes: 35


Total Treatment Time: 55


Procedures billed for this date of service:: hp,US,manual therapy 1





Assessment


Assessment: Patient has met rehab potential at this time ,has good 

understanding ofHEP,energy conservation and pain control.She is aware of D/C 

today.


Patient Education: Education of diagnosis, Body/Joint mechanics, Home Exercise 

Program, Home Safety, Activity Modification, Education of Plan of Care


Rehab Potential: Good





Short Term Goals


Goal #1: pt rate pain <8/10 with activity


Goal to be met by: 12/18/17


Progress towards Goal:: Met


Goal #2: Increased ROM L shld flex 118, abd 80, ER 35 Rshld flex 120 abd 70 ER 

42


Goal to be met by: 12/18/17


Progress towards Goal:: Progressing


Goal #3: pt able to don/doff jacket without assistance 


Goal to be met by: 12/18/17


Progress towards Goal:: Met





Long Term Goals


Goal #1: pt rate pain <6/10 B shlds


Goal to be met by: 01/03/18


Progress towards goal: Met


Goal #2: Increase ROM B shlds WFL's to allow for independence with ADL's


Goal to be met by: 01/03/18


Progress towards goal: Partially Met


Goal #3: pt increased strength BUE 4- to 4/5 and independent with HEP.


Goal to be met by: 01/03/18


Progress towards goal: Partially Met


Goal #4: Improvement on UE functional index to > 50


Goal to be met by: 01/03/18





Plan


Reason for Discharge:: Maximum Potential Met (D/C)

## 2018-02-12 NOTE — RS.OPPTDN
Subjective


Date of Note: 02/12/18


Visit #: 2


Date of Evaluation: 02/09/18


Payer Source: Medicaid


Treatment Diagnosis: tendinopathy of B rotator cuffs, pain in B shlds


Current Subjective/complaints:: Patient says that she is happy with how well 

her R arm is doing.  She says the L is pretty stiff and sore.  Reports more 

limitation with the L.  She states she is using ice at home and does help.


*Precautions: n/a





Pain Assessment





- Pain Description


Pain Location: L shoulder mild to moderate pain.  Does not take prescription 

pain meds, but 800 mg Motrin.





- Heat/Cryotherapy


Treatment: Hot Pack ( to the L shoulder in supine while exercising the R)





Interventions





- Exercise/Activities/Manual Therapy


Exercises/Activities: PROM bilateral shoulders all dir.  Manual isometrics all 

dir of shoulder bilaterally 2x5.  Long axis joint distraction for the L.  1# 

wand for bilateral shoulder flexion x 10, chest presses x 10.  Performed 

shoulder shrugs and scap adduction at EOB.  Finished with pulleys x 15 reps for 

flexion.


Total minutes of Exercise: 38


Manual Therapy: n/a


HOME EXERCISE PROGRAM: pt given written HEP including PROM shld flex stretch, 

under chin stretch, pendulum ex, scapular retraction, shld shrugs





- Charges


Timed Code Treatment Minutes: 38


Total Treatment Time: 38


Procedures billed for this date of service:: ex3, hp


Assessment: Patient demo AROM for the R shoulder WFL currently and appeared to 

bong all activity quite well.  L shoulder is more limited with all ranges 

especially ABD and is more painful.  Following moist heat and therex, L 

shoulder motion had improved by approx 15 degrees for flexion.  ABD remained 

nearly the same.


Patient Education: Education of diagnosis, Body/Joint mechanics, Home Exercise 

Program


Patient demonstrates compliance with HEP?: Yes





Short Term Goals


Goal #1: pt rate pain <8/10 with activity


Goal to be met by: 03/02/18


Goal #2: Increase R shld flex 135 abd 115 ER 35, L shld flex 130, abd 90, ER 50


Goal to be met by: 03/02/18


Goal #3: pt able to get dressed with decreased pain without assist


Goal to be met by: 03/02/18





Long Term Goals


Goal #1: pt rate pain <6/10 B shlds


Goal to be met by: 03/23/18


Goal #2: Increase ROM B shlds WFL's to allow for independence with ADL's


Goal to be met by: 03/23/18


Goal #3: pt increased strength BUE 4- to 4/5 and independent with HEP.


Goal to be met by: 03/23/18


Goal #4: Improvement on UE functional index to > 40


Goal to be met by: 03/23/18





Plan


PLAN OF CARE EXPIRES ON:: 03/23/18


ORDER # VISITS AND/OR THROUGH DATE: 3/23/18


PLAN: Patient to continue possibly daily for therex to bilateral shoulders to 

improve ROM and strength.

## 2018-02-12 NOTE — RS.OPPTEV2
Date of Note: 02/09/18


Visit #: 1


Date of Evaluation: 02/09/18


Payer Source: Medicaid


Surgery Performed?: Yes


Procedure Performed: 





B shld manipulation 2/8/18


Treatment Diagnosis: tendinopathy of B rotator cuffs, pain in B shlds


History of Condition/Mechanism of Injury:: pt states her shlds have been 

hurting for approx 1 year. No specific date of injury.


Prior Level of Function.....Patient was independent with: ADL's, Self Care, 

Ambulation/Mobility, Community Integration/Access


Functional Limitations: Sleep, ADL's, Reaching, Pulling, Lifting


Current Subjective/complaints:: pt states she has had more pain in L shld this 

am. pt states she drove herself this am with some difficulty.


Treatment Side (optional): Bilateral


*Precautions: n/a





Medical History


Medical History: COPD, Diabetes, Arthritis


Medical History Comments:: "heart problems", osteoporosis


Smoking Status: Former smoker


Hx Home Medications: furosemide, metformin, proair HFA, clorazepate, digoxin, 

ipratropi/alb, lovastatin, meloxicam, diltiazem, chlordiazepoxide, gabapentin, 

potassium, gemfibrozil


Patient's Goals: pt goal is to be able to use her arms better.





Pain Assessment





- Pain Description


Pain Location: B shlds


Pain Description: Tightness, Aching


Current Pain Intensity: 10/10


Worst Pain Intensity: 10/10





Functional Outcome Measure


UE Functional Index: 8 (90)





- G Codes & Severity Modifier


G Codes & Modifier: n/a


Source of G Code score: n/a





Observation





- Observation


Inspection: forward head, rounded shlds, increased thoracic kyphosis


Handedness: Right





Gait





- Gait Pattern


General Gait Pattern Observation: No Deviations/Normal





- Left Shoulder ROM


Left Shoulder Flexion: 110 (AAROM)


Left Shoulder Abduction: 71 (AAROM)


Left Shoulder External Rotation: 42 (AAROM)


Left Shoulder ROM Limitations: Soft Tissue Tightness, Muscle Weakness, Pain





- Right Shoulder ROM


Right Shoulder Flexion: 128 (AAROM)


Right Shoulder Abduction: 108 (AAROM)


Right Shoulder External Rotation: 28 (AAROM)


Right Shoulder ROM Limitations: Soft Tissue Tightness, Muscle Weakness, Pain





- Left Shoulder Strength


Left Shoulder Flexion: 2+   Poor+


Left Shoulder Abduction: 2+   Poor+


Left Shoulder Internal Rotation: 2    Poor





- Right Shoulder Strength


Right Shoulder Flexion: 2+   Poor+


Right Shoulder Abduction: 2+   Poor+


Right Shoulder External Rotation: 2    Poor





- Special Tests


Comments: BUE shld pain in all planes of ROM.





Palpation


Palpation Findings: Tenderness, Trigger Point, Muscle Guarding (B shld rotator 

cuff, as well as scapular muscles and upper traps. )





Sensation





- Sensation


Right Upper Extremity: Intact/Normal


Left Upper Extremity: Intact/Normal


Right Lower Extremity: Intact/Normal


Left Lower Extremity: Intact/Normal





Balance





- Sitting Balance


Static Sitting Balance: Normal


Dynamic Sitting Balance: Normal





- Standing Balance


Static Standing Balance: Normal


Dynamic Standing Balance: Normal





- Heat/Cryotherapy


Treatment: Cryotherapy


Comments:: B shlds





Interventions





- Exercise/Activities/Manual Therapy


Exercises/Activities: pt received PROM B shld flex, abd, ER/IR, pt also 

performed AAROM IR, pendulum ex, pt unable to perform towel stretch behind back 

per protocol due to BUE shld involvement.


Manual Therapy: n/a


HOME EXERCISE PROGRAM: pt given written HEP including PROM shld flex stretch, 

under chin stretch, pendulum ex, scapular retraction, shld shrugs





- Charges


Timed Code Treatment Minutes: 50


Total Treatment Time: 62


Procedures billed for this date of service:: eval med, cold pack





EVALUATION COMPLEXITY LEVEL


EVALUATION COMPLEXITY LEVEL: HISTORY: Medium (COPD, B shld pain, DM, HTN, OA), 

EXAM OF BODY SYSTEMS: Medium (pain, ROM, strength), CLINICAL PRESENTATION: 

Medium (evolving), CLINICAL DECISION MAKING: Medium





Assessment


Assessment: pt presents with decreased strength BUE, pain B shlds, decreased ROM

, limiting functional ability.


Patient Education: Home Exercise Program, Activity Modification, Education of 

Plan of Care


Rehab Potential: Good





Short Term Goals


Goal #1: pt rate pain <8/10 with activity


Goal to be met by: 02/16/18


Goal #2: Increase R shld flex 135 abd 115 ER 35, L shld flex 130, abd 90, ER 50


Goal to be met by: 02/16/18


Goal #3: pt able to get dressed with decreased pain without assist


Goal to be met by: 02/16/18





Long Term Goals


Goal #1: pt rate pain <6/10 B shlds


Goal to be met by: 03/01/18


Goal #2: Increase ROM B shlds WFL's to allow for independence with ADL's


Goal to be met by: 03/01/18


Goal #3: pt increased strength BUE 4- to 4/5 and independent with HEP.


Goal to be met by: 03/01/18


Goal #4: Improvement on UE functional index to > 40


Goal to be met by: 03/01/18





Plan





- Treatment to be Provided


Procedures: Therapeutic Exercises, Therapeutic Activity, Neuromuscular Rehab, 

Manual Therapy, Patient Education


Modalities: Electrical Stimulation, Ultrasound/Phonophoresis, Class IV Laser, 

Cryotherapy, Hot Packs





- Treatment Plan


Frequency: 3 X week


Duration: 3 weeks


ORDER # VISITS AND/OR THROUGH DATE: 3/23/18





- Treatment Code


(1) Pain in joint, shoulder region


Code(s): M25.519 - PAIN IN UNSPECIFIED SHOULDER   


Qualifiers: 


   Laterality: bilateral   Qualified Code(s): M25.511 - Pain in right shoulder; 

M25.512 - Pain in left shoulder; M25.512 - Pain in left shoulder   





(2) Adhesive capsulitis of shoulder


Code(s): M75.00 - ADHESIVE CAPSULITIS OF UNSPECIFIED SHOULDER   


Qualifiers: 


   Laterality: left   Qualified Code(s): M75.02 - Adhesive capsulitis of left 

shoulder   





(3) Tendinopathy of left rotator cuff


Code(s): M67.912 - UNSPECIFIED DISORDER OF SYNOVIUM AND TENDON, LEFT SHOULDER   





(4) Other specified postprocedural states


Code(s): Z98.89 - OTHER SPECIFIED POSTPROCEDURAL STATES * DO NOT USE *   


Comments: 


B shld manipulation

## 2018-02-13 NOTE — RS.OPPTDN
Subjective


Date of Note: 02/13/18


Visit #: 3


Date of Evaluation: 02/09/18


Payer Source: Medicaid


Treatment Diagnosis: tendinopathy of B rotator cuffs, pain in B shlds


Current Subjective/complaints:: Patient reports addition of overhead shoulder 

pulleys yesterday has helped mobility of the left shoulder. States she is 

working on HEP and is now able to don/doff shirt without help.


*Precautions: n/a





Pain Assessment





- Pain Description


Pain Location: Bilateral sholder joints


Pain Description: Aching


Current Pain Intensity: mild to mod right shoulder and mod+ left shoulder with 

movement





- Heat/Cryotherapy


Treatment: Hot Pack ([id number] to the bilateral shoulder joints and upper traps 

prior to EX. Patient in supine. )





Interventions





- Exercise/Activities/Manual Therapy


Exercises/Activities: PROM bilateral shoulders all directions. Manual 

isometrics to bilateral shoulders all directions. Gentle joint mobs and 

distraction left shouulder joint. 1# wand for bilateral shoulder flexion 

overhead and chest presses, multiple reps. Patient holds ball overhead at 90 

shoulder flexion, performs isometric shoulder add, active horizontal abd/add, 

and overhead flexion.  Performed shoulder shrugs and scap adduction in sitting. 

Ended with shoulder pulleys slow pace and passive stretch, 5mins.


Total minutes of Exercise: 40mins/45mins


Manual Therapy: n/a


HOME EXERCISE PROGRAM: pt given written HEP including PROM shld flex stretch, 

under chin stretch, pendulum ex, scapular retraction, shld shrugs





- Charges


Timed Code Treatment Minutes: 40mins 


Total Treatment Time: 65mins 


Procedures billed for this date of service:: HP, EX3


Assessment: Patient reporting good response to work on shoulder pulleys and 

increase in functional activities at home.


Patient Education: Education of diagnosis, Body/Joint mechanics, Home Exercise 

Program


Patient demonstrates compliance with HEP?: Yes





Short Term Goals


Goal #1: pt rate pain <8/10 with activity


Goal to be met by: 03/02/18


Progress towards Goal:: Progressing


Goal #2: Increase R shld flex 135 abd 115 ER 35, L shld flex 130, abd 90, ER 50


Goal to be met by: 03/02/18


Progress towards Goal:: Progressing


Goal #3: pt able to get dressed with decreased pain without assist


Goal to be met by: 03/02/18


Progress towards Goal:: Partially Met





Long Term Goals


Goal #1: pt rate pain <6/10 B shlds


Goal to be met by: 03/23/18


Goal #2: Increase ROM B shlds WFL's to allow for independence with ADL's


Goal to be met by: 03/23/18


Goal #3: pt increased strength BUE 4- to 4/5 and independent with HEP.


Goal to be met by: 03/23/18


Goal #4: Improvement on UE functional index to > 40


Goal to be met by: 03/23/18





Plan


PLAN OF CARE EXPIRES ON:: 03/23/18


ORDER # VISITS AND/OR THROUGH DATE: 3/23/18


PLAN: Progress exercise to reduce pain, increase ROM, and improve functional 

activity level.

## 2018-02-15 NOTE — RS.OPPTDN
Subjective


Date of Note: 02/15/18


Visit #: 4


Date of Evaluation: 02/09/18


Payer Source: Medicaid


Treatment Diagnosis: tendinopathy of B rotator cuffs, pain in B shlds


Current Subjective/complaints:: see progress report


*Precautions: n/a





Pain Assessment





- Pain Description


Pain Location: B shlds


Pain Description: Aching


Current Pain Intensity: 7/10





- Heat/Cryotherapy


Treatment: Hot Pack, Cryotherapy


Comments:: hot packs B shlds prior to tx.  cold packs B shlds after tx





Interventions





- Exercise/Activities/Manual Therapy


Exercises/Activities: pt recieved AAROM B shlds with gentle joint mobilization 

and distraction L shld. Manual isometric shld flex, abd, add. pt performed shld 

flex overhead and chest press with 1#wand 2 sets of 10 reps, pulleys x 5 mins 

with slow pace and passive stretch


Total minutes of Exercise: 40


Manual Therapy: n/a


HOME EXERCISE PROGRAM: pt given written HEP including PROM shld flex stretch, 

under chin stretch, pendulum ex, scapular retraction, shld shrugs





- Objective Findings


Observations,measurements,etc.: see progress note





- Charges


Timed Code Treatment Minutes: 42


Total Treatment Time: 60


Procedures billed for this date of service:: ex 3, hp


Assessment: see progress report


Patient Education: Home Exercise Program, Home Safety, Education of Plan of Care


Patient demonstrates compliance with HEP?: Yes





Short Term Goals


Goal #1: pt rate pain <8/10 with activity


Goal to be met by: 02/16/18 (pain 7/10)


Progress towards Goal:: Met


Goal #2: Increase R shld flex 135 abd 115 ER 35, L shld flex 130, abd 90, ER 50


Goal to be met by: 02/16/18 (R shld goals met, L shld progressing)


Progress towards Goal:: Partially Met


Goal #3: pt able to get dressed with decreased pain without assist


Goal to be met by: 02/16/18


Progress towards Goal:: Met





Long Term Goals


Goal #1: pt rate pain <6/10 B shlds


Goal to be met by: 03/01/18


Progress towards goal: Progressing


Goal #2: Increase ROM B shlds WFL's to allow for independence with ADL's


Goal to be met by: 03/01/18


Progress towards goal: Progressing


Goal #3: pt increased strength BUE 4- to 4/5 and independent with HEP.


Goal to be met by: 03/01/18 (pt is compliant with HEP, strength improving)


Progress towards goal: Progressing


Goal #4: Improvement on UE functional index to > 40


Goal to be met by: 03/01/18 (not tested this visit)





Plan


PLAN OF CARE EXPIRES ON:: 03/02/18


ORDER # VISITS AND/OR THROUGH DATE: 3/2/18


PLAN: continue to progress with ex for stretching and strengthening B shlds

## 2018-02-15 NOTE — RS.PTSUM
Progress Note/Summary


Date of Note: 02/15/18


Date of Evaluation: 02/09/18


Number of Visits: 4


Reporting Period for this Progress Note: pt returning to MD 2/16/18


Current Complaints/Gains: pt states she feels she has improved with PT. She 

states she has had a little more pain this am wondering if it is due to the 

weather.


Objective Measurements/Presentation: AAROM R shld flex 148, abd 122, ER 55.  L 

shld flex 128, Abd 112, ER 44.  Strength: R shld flex 3+/5, elbow flex/ext 4/5,

.  L shld flex 3-/5, elbow flex/ext 4-/5


G Codes: n/a


Source of G Code Score: n/a





- Short Term Goals


Goal #1: pt rate pain <8/10 with activity


Goal to be met by: 02/16/18 (pain 7/10)


Progress towards Goal:: Met


Goal #2: Increase R shld flex 135 abd 115 ER 35, L shld flex 130, abd 90, ER 50


Goal to be met by: 02/16/18 (R shld goals met, L shld progressing)


Progress towards Goal:: Partially Met


Goal #3: pt able to get dressed with decreased pain without assist


Goal to be met by: 02/16/18


Progress towards Goal:: Met





- Long Term Goals


Goal #1: pt rate pain <6/10 B shlds


Goal to be met by: 03/01/18


Progress towards goal: Progressing


Goal #2: Increase ROM B shlds WFL's to allow for independence with ADL's


Goal to be met by: 03/01/18


Progress towards goal: Progressing


Goal #3: pt increased strength BUE 4- to 4/5 and independent with HEP.


Goal to be met by: 03/01/18 (pt is compliant with HEP, strength improving)


Progress towards goal: Progressing


Goal #4: Improvement on UE functional index to > 40


Goal to be met by: 03/01/18 (not tested this visit)





- Assessment


Assessment of Improvement/Progress: pt has made significant progress with ROM B 

shlds and decreased pain. pt is now able to dress herself independently as well 

as perform all ADL's independently. pt continues with decreased strength, ROM B 

shlds which is limiting ability to perform household duties and continues  with 

pain B shlds. Feel pt would continue to benefit from skilled PT for therex for 

stretching, strengthening as well as modalities to decrease pain and improve 

functional ability.


Summary: Patient has made progress towards goals.





- Plan


Plan: Continue Plan of Care


Comments: 





Continue 3x a week for 2 more weeks per initial order


Frequency: 3 X week


Duration: 2 weeks


PLAN OF CARE EXPIRES ON:: 03/02/18


ORDER # VISITS AND/OR THROUGH DATE: 3/2/18

## 2018-02-19 NOTE — RS.OPPTDN
Subjective


Date of Note: 02/19/18


Visit #: 7


Date of Evaluation: 02/09/18


Payer Source: Medicaid


Treatment Diagnosis: tendinopathy of B rotator cuffs, pain in B shlds


Current Subjective/complaints:: Patient says she is gaining ROM and strength. 

She reports that the L arm is slowly improving, but it is stiff today.  She 

says she continues to work on HEP and is only having trouble with washing her 

back with the L arm.


*Precautions: n/a





Pain Assessment





- Pain Description


Pain Location: ache and stiff to L shoulder today





- Heat/Cryotherapy


Treatment: Hot Pack (15 mins bilateral shoulders in supine)





Interventions





- Exercise/Activities/Manual Therapy


Exercises/Activities: pt recieved AAROM B shlds with gentle joint mobilization 

and distraction L shld. Manual isometric bilateral shld flex, abd, add, IR/ER. 

pt performed shld flex overhead and chest press with 1#wand 2 sets of 10 reps.  

Sitting:  red tband for scap retraction and 1# wand for bilateral shoulder 

flexion.  Pulleys x 5 mins with slow pace and passive stretch


Total minutes of Exercise: 40


Manual Therapy: n/a


HOME EXERCISE PROGRAM: pt given written HEP including PROM shld flex stretch, 

under chin stretch, pendulum ex, scapular retraction, shld shrugs





- Charges


Timed Code Treatment Minutes: 40


Total Treatment Time: 15


Procedures billed for this date of service:: hp, ex3


Assessment: Patient experiencing stiffness and ache to the L shoulder today, 

but as joint mobs and exercise progress, ROM improves and is more tolerable.  

She is able to produce improved resistance during isometrics today compared to 

recent visits considering the L UE.  R UE AROM is near WNL and remains very low 

level to no pain.  Patient did follow up with her ortho last week and MD agreed 

she is doing well and to continue PT.


Patient Education: Education of diagnosis, Home Exercise Program


Patient demonstrates compliance with HEP?: Yes





Short Term Goals


Goal #1: pt rate pain <8/10 with activity


Goal to be met by: 02/16/18 (pain 7/10)


Progress towards Goal:: Met


Goal #2: Increase R shld flex 135 abd 115 ER 35, L shld flex 130, abd 90, ER 50


Goal to be met by: 02/16/18 (R shld goals met, L shld progressing)


Progress towards Goal:: Partially Met


Goal #3: pt able to get dressed with decreased pain without assist


Goal to be met by: 02/16/18


Progress towards Goal:: Met





Long Term Goals


Goal #1: pt rate pain <6/10 B shlds


Goal to be met by: 03/01/18


Progress towards goal: Progressing


Goal #2: Increase ROM B shlds WFL's to allow for independence with ADL's


Goal to be met by: 03/01/18


Progress towards goal: Progressing


Comments: Patient admits to being able to resume most ADLs except wash back w/L 

UE


Goal #3: pt increased strength BUE 4- to 4/5 and independent with HEP.


Goal to be met by: 03/01/18 (pt is compliant with HEP, strength improving)


Progress towards goal: Progressing


Goal #4: Improvement on UE functional index to > 40


Goal to be met by: 03/01/18 (not tested this visit)





Plan


PLAN OF CARE EXPIRES ON:: 03/02/18


ORDER # VISITS AND/OR THROUGH DATE: 3/2/18


PLAN: Continue with therex to bilateral shoulders to improve ROM and strength 

for all ADLs

## 2018-02-20 NOTE — RS.OPPTDN
Subjective


Date of Note: 02/20/18


Visit #: 6


Date of Evaluation: 02/09/18


Payer Source: Medicaid


Treatment Diagnosis: tendinopathy of B rotator cuffs, pain in B shlds


Current Subjective/complaints:: Patient c/o not feeling well today and not 

getting any sleep last night.  She says this is not related to pain.  She says 

she feels that if she could have another week or two of therapy, she should be 

ok.  She says her main problem is L shoulder limited rotation.


*Precautions: n/a





Pain Assessment





- Pain Description


Pain Location: L shoulder





- Heat/Cryotherapy


Treatment: Hot Pack (L shoulder in supine while ex to the R)





Interventions





- Exercise/Activities/Manual Therapy


Exercises/Activities: pt received AAROM B shlds with joint mobilization and 

distraction L shld. Manual isometric bilateral shld flex, abd, add, IR/ER.  1# 

dumbell for bilateral wrists, elbow, and R shoulder punches.   Pt performed 

shld flex overhead and chest press with 2#wand 2 sets of 10 reps.  Sitting:  

red tband for scap retraction and 1# wand for bilateral shoulder flexion.  

Pulleys x 5 mins with slow pace and passive stretch


Total minutes of Exercise: 40


Manual Therapy: n/a


HOME EXERCISE PROGRAM: pt given written HEP including PROM shld flex stretch, 

under chin stretch, pendulum ex, scapular retraction, shld shrugs





- Charges


Timed Code Treatment Minutes: 40


Total Treatment Time: 40


Procedures billed for this date of service:: ex3, hp


Assessment: Patient progressing and responding well with pain and ROM to the R 

shoulder.  L is improving, but more slowly.  ER passively and actively produces 

soreness.  All other therex bong well.  AAROM for L during pulleys is WNL (flex).


Patient Education: Education of diagnosis, Body/Joint mechanics, Home Exercise 

Program


Patient demonstrates compliance with HEP?: Yes





Short Term Goals


Goal #1: pt rate pain <8/10 with activity


Goal to be met by: 02/16/18 (pain 7/10)


Progress towards Goal:: Met


Goal #2: Increase R shld flex 135 abd 115 ER 35, L shld flex 130, abd 90, ER 50


Goal to be met by: 02/16/18 (R shld goals met, L shld progressing)


Progress towards Goal:: Partially Met


Goal #3: pt able to get dressed with decreased pain without assist


Goal to be met by: 02/16/18


Progress towards Goal:: Met





Long Term Goals


Goal #1: pt rate pain <6/10 B shlds


Goal to be met by: 03/01/18


Progress towards goal: Partially Met


Goal #2: Increase ROM B shlds WFL's to allow for independence with ADL's


Goal to be met by: 03/01/18


Progress towards goal: Progressing


Goal #3: pt increased strength BUE 4- to 4/5 and independent with HEP.


Goal to be met by: 03/01/18 (pt is compliant with HEP, strength improving)


Progress towards goal: Progressing


Goal #4: Improvement on UE functional index to > 40


Goal to be met by: 03/01/18 (not tested this visit)





Plan


PLAN OF CARE EXPIRES ON:: 03/02/18


ORDER # VISITS AND/OR THROUGH DATE: 3/2/18


PLAN: Patient to continue TIW 3 more sessions

## 2018-02-22 NOTE — RS.OPPTDN
Subjective


Date of Note: 02/22/18


Visit #: 7


Date of Evaluation: 02/09/18


Payer Source: Medicaid


Treatment Diagnosis: tendinopathy of B rotator cuffs, pain in B shlds


Current Subjective/complaints:: Patient says that she is now able to wash her 

hair in the sink for the first time since her shoulders have been hurt with no 

difficulty.  She says she enjoys washing her hair this way and has had to 

change it due to her pain and manipulations.  She says she is noticing small 

improvements and is pleased with R shoulder progress.  She wants the L shoulder 

to get a little better next week and then work on HEP.


*Precautions: n/a





Pain Assessment





- Pain Description


Pain Location: Patient reports no pain to the R only mild to the L





- Heat/Cryotherapy


Treatment: Hot Pack (L shoulder only while exercising the R)





Interventions





- Exercise/Activities/Manual Therapy


Exercises/Activities: pt received AAROM B shlds with joint mobilization and 

distraction L shld. Manual isometric bilateral shld flex, abd, add, IR/ER.  1# 

dumbell for bilateral wrists, elbow, and R shoulder punches.   Pt performed 

shld flex overhead and chest press with 2#wand 2 sets of 10 reps.  Sitting:  

red tband for scap retraction and 1# wand for bilateral shoulder flexion.  

Pulleys x 5 mins with slow pace and passive stretch using 1 1/2# each arm for 

half the time.


Total minutes of Exercise: 38


Manual Therapy: n/a


HOME EXERCISE PROGRAM: pt given written HEP including PROM shld flex stretch, 

under chin stretch, pendulum ex, scapular retraction, shld shrugs





- Charges


Timed Code Treatment Minutes: 38


Total Treatment Time: 38


Procedures billed for this date of service:: hp, ex3


Assessment: Patient progressing with PROM R shoulder WNL now and L shoulder 

WFL.  Active L shoulder flexion 143 degrees.  Patient able to resume preferred 

way of washing hair for the first time since her shoulder problem began.


Patient Education: Education of diagnosis, Body/Joint mechanics, Home Exercise 

Program


Patient demonstrates compliance with HEP?: Yes





Short Term Goals


Goal #1: pt rate pain <8/10 with activity


Goal to be met by: 02/16/18 (pain 7/10)


Progress towards Goal:: Met


Goal #2: Increase R shld flex 135 abd 115 ER 35, L shld flex 130, abd 90, ER 50


Goal to be met by: 02/16/18 (R shld goals met, L shld progressing)


Progress towards Goal:: Met


Goal #3: pt able to get dressed with decreased pain without assist


Goal to be met by: 02/16/18


Progress towards Goal:: Met





Long Term Goals


Goal #1: pt rate pain <6/10 B shlds


Goal to be met by: 03/01/18


Progress towards goal: Partially Met


Goal #2: Increase ROM B shlds WFL's to allow for independence with ADL's


Goal to be met by: 03/01/18


Progress towards goal: Partially Met


Comments: Met for the R, resuming most ADLs for the L


Goal #3: pt increased strength BUE 4- to 4/5 and independent with HEP.


Goal to be met by: 03/01/18 (pt is compliant with HEP, strength improving)


Progress towards goal: Progressing


Goal #4: Improvement on UE functional index to > 40


Goal to be met by: 03/01/18 (not tested this visit)


Progress towards goal: Progressing (Reassess next week)





Plan


PLAN OF CARE EXPIRES ON:: 03/02/18


ORDER # VISITS AND/OR THROUGH DATE: 3/2/18


PLAN: Patient to continue x 2 more sessions for therex concentrating on L UE

## 2018-02-26 ENCOUNTER — HOSPITAL ENCOUNTER (OUTPATIENT)
Dept: HOSPITAL 58 - REHAB | Age: 63
LOS: 2 days | End: 2018-02-28
Attending: ORTHOPAEDIC SURGERY

## 2018-02-26 VITALS — BODY MASS INDEX: 19.3 KG/M2

## 2018-02-26 DIAGNOSIS — M75.01: ICD-10-CM

## 2018-02-26 DIAGNOSIS — M75.02: Primary | ICD-10-CM

## 2018-02-26 NOTE — RS.OPPTDN
Subjective


Date of Note: 02/26/18


Visit #: 8


Date of Evaluation: 02/09/18


Payer Source: Medicaid


Treatment Diagnosis: tendinopathy of B rotator cuffs, pain in B shlds


Current Subjective/complaints:: Patient says her shoulders are doing very well 

with seeing increased motion to the L shoulder, but remains with pain at end 

ranges with stretching.  She says R shoulder is no problem at this point.


*Precautions: n/a





- Heat/Cryotherapy


Treatment: Hot Pack (to the L shoulder in supine while exercising the R)





Interventions





- Exercise/Activities/Manual Therapy


Exercises/Activities: pt received AAROM B shlds with joint mobilization and 

distraction L shld. Manual isometric bilateral shld flex, abd, add, IR/ER 2x5.  

Progressed to 2# dumbell for bilateral wrists, elbow, and R shoulder punches.   

Pt performed shld flex overhead and chest press with 2 1/2# wand 2 sets of 10 

reps. Red tband for shoulder pull downs bilaterally x 10.  Sitting:  red tband 

for scap retraction and 2# wand for bilateral shoulder flexion.  Pulleys x 5 

mins with slow pace and passive stretch using 1 1/2# each arm for half the time.


Total minutes of Exercise: 38


Manual Therapy: n/a


HOME EXERCISE PROGRAM: pt given written HEP including PROM shld flex stretch, 

under chin stretch, pendulum ex, scapular retraction, shld shrugs





- Charges


Timed Code Treatment Minutes: 38


Total Treatment Time: 38


Procedures billed for this date of service:: ex3, hp


Assessment: Patient demo full PROM to the R shoulder, L is now WNL passively, 

but pain exists at end range flexion and abd.  She is gaining strength in 

antigravity positions and bong increased endurance above head activities.


Patient Education: Home Exercise Program, Education of Plan of Care


Patient demonstrates compliance with HEP?: Yes





Short Term Goals


Goal #1: pt rate pain <8/10 with activity


Goal to be met by: 02/16/18 (pain 7/10)


Progress towards Goal:: Met


Goal #2: Increase R shld flex 135 abd 115 ER 35, L shld flex 130, abd 90, ER 50


Goal to be met by: 02/16/18 (R shld goals met, L shld progressing)


Progress towards Goal:: Met


Goal #3: pt able to get dressed with decreased pain without assist


Goal to be met by: 02/16/18


Progress towards Goal:: Met





Long Term Goals


Goal #1: pt rate pain <6/10 B shlds


Goal to be met by: 03/01/18


Progress towards goal: Partially Met


Comments: little to none for the R, L only at end ranges


Goal #2: Increase ROM B shlds WFL's to allow for independence with ADL's


Goal to be met by: 03/01/18


Progress towards goal: Partially Met


Goal #3: pt increased strength BUE 4- to 4/5 and independent with HEP.


Goal to be met by: 03/01/18 (pt is compliant with HEP, strength improving)


Progress towards goal: Progressing


Goal #4: Improvement on UE functional index to > 40


Goal to be met by: 03/01/18 (not tested this visit)


Progress towards goal: Progressing (Reassess next week)





Plan


PLAN OF CARE EXPIRES ON:: 03/02/18


ORDER # VISITS AND/OR THROUGH DATE: 3/2/18


PLAN: Patient to continue through this week for progressive therex to the L 

shoulder primarily.

## 2018-03-01 NOTE — RS.OPPTDN
Subjective


Date of Note: 03/01/18


Visit #: 9


Date of Evaluation: 02/09/18


Payer Source: Medicaid


Treatment Diagnosis: tendinopathy of B rotator cuffs, pain in B shlds


Current Subjective/complaints:: Patient reports she continues to have 

discomfort in the left shoulder with reaching activities. She reports she is 

consistent with HEP and will continue when she completes therapy.


*Precautions: n/a





Pain Assessment





- Pain Description


Pain Location: Left shoulder


Pain Description: Aching


Current Pain Intensity: mild to moderate depending on activity





- Heat/Cryotherapy


Treatment: Hot Pack (r19cyon to the left shoulder prior to EX. Patient in 

supine. )





Interventions





- Exercise/Activities/Manual Therapy


Exercises/Activities: PROM, AAROM, and gentle joint mobilization/distraction of 

the left shoulder joint. Manual isometrics left shld flex, ext, abd, add, IR/ER

, multipe sets of 5reps. Performed shld flex overhead and chest press with 3# 

wand 2 sets of 10 reps. Red tband for shoulder flex, ext, IR, ER, biceps, and 

triceps.  Sitting:  red tband for scap retraction and 2# wand for bilateral 

shoulder flexion. Doorway anterior chest stretch, 3 positions. Pulleys x 5 mins 

with slow pace.


Total minutes of Exercise: 30mins/35mins  


Manual Therapy: n/a


HOME EXERCISE PROGRAM: pt given written HEP including PROM shld flex stretch, 

under chin stretch, pendulum ex, scapular retraction, shld shrugs





- Charges


Timed Code Treatment Minutes: 30mins 


Total Treatment Time: 55mins 


Procedures billed for this date of service:: HP, EX2


Assessment: Progressing well with AROM and gentle stretching.


Patient Education: Body/Joint mechanics, Home Exercise Program


Patient demonstrates compliance with HEP?: Yes





Short Term Goals


Goal #1: pt rate pain <8/10 with activity


Goal to be met by: 02/16/18 (pain 7/10)


Progress towards Goal:: Met


Goal #2: Increase R shld flex 135 abd 115 ER 35, L shld flex 130, abd 90, ER 50


Goal to be met by: 02/16/18 (R shld goals met, L shld progressing)


Progress towards Goal:: Met


Goal #3: pt able to get dressed with decreased pain without assist


Goal to be met by: 02/16/18


Progress towards Goal:: Met





Long Term Goals


Goal #1: pt rate pain <6/10 B shlds


Goal to be met by: 03/01/18


Progress towards goal: Partially Met


Goal #2: Increase ROM B shlds WFL's to allow for independence with ADL's


Goal to be met by: 03/01/18


Progress towards goal: Partially Met


Goal #3: pt increased strength BUE 4- to 4/5 and independent with HEP.


Goal to be met by: 03/01/18 (pt is compliant with HEP, strength improving)


Progress towards goal: Progressing


Goal #4: Improvement on UE functional index to > 40


Goal to be met by: 03/01/18 (not tested this visit)


Progress towards goal: Progressing (Reassess next week)





Plan


PLAN OF CARE EXPIRES ON:: 03/02/18


ORDER # VISITS AND/OR THROUGH DATE: 3/2/18


PLAN: Progress exercise and prepare for discharge with HEP.

## 2018-03-02 ENCOUNTER — HOSPITAL ENCOUNTER (OUTPATIENT)
Dept: HOSPITAL 58 - REHAB | Age: 63
LOS: 29 days | End: 2018-03-31
Attending: ORTHOPAEDIC SURGERY

## 2018-03-02 VITALS — BODY MASS INDEX: 19.3 KG/M2

## 2018-03-02 DIAGNOSIS — M75.01: ICD-10-CM

## 2018-03-02 DIAGNOSIS — M75.02: Primary | ICD-10-CM

## 2018-03-02 NOTE — RS.OPPTDC
Date of Discharge: 03/02/18


Date of Evaluation: 02/09/18


Number of Visits: 10


Treatment Diagnosis: tendinopathy of B rotator cuffs, pain in B shlds


Current Complaints/Gains: Patient says she is now able to do all ADLs with only 

little to no difficulty.  She says she was able to use her L arm to wash her 

upper back.  She says this was very important to her and has not been able to 

do this since her surgery.  Her c/c at this time is sleep.  She says the 

surgery has "thrown off" the amount of time and just trying to get to sleep.  

She says if she could get more sleep, she'd be "back to normal."  Feli reports 

her ortho has released her at this time.





Pain Assessment





- Pain Description


Pain Location: none at rest for L or R.  L soreness with end range flexion.





Functional Outcome Measure


UE Functional Index: 63





- G Codes & Severity Modifier


G Codes & Modifier: na


Source of G Code score: na





Observation





- Observation


Posture: Forward Head, Rounded Shoulders


Handedness: Right





General


Range of Motion: R:  FLEX/ABD, ER/IR Active-- WNL.  L:  FLEX--156 Active, 164 

Passive.  ABD--160 Active, 170 Passive.  ER/IR-- WNL


Muscle Strength: 4+/5 bilateral UE





- Heat/Cryotherapy


Treatment: Hot Pack (to L shoulder during exercise to the R)





Interventions





- Exercise/Activities/Manual Therapy


Exercises/Activities: PROM, AAROM, and gentle joint mobilization/distraction of 

the left shoulder joint. Manual isometrics left shld flex, ext, abd, add, IR/ER

, multipe sets of 5reps. Performed shld flex overhead and chest press with 3# 

wand 2 sets of 10 reps. Red tband for shoulder flex, ext, IR, ER, biceps, and 

triceps.  2# for R shoulder punches, elbow flexion/extension, shoulder flexion 

x 10.  1# L shoulder and elbow motions x 10.  Sitting:  red tband for scap 

retraction and 2# wand for bilateral shoulder flexion.


Total minutes of Exercise: 38


Manual Therapy: n/a


HOME EXERCISE PROGRAM: pt given written HEP including PROM shld flex stretch, 

under chin stretch, pendulum ex, scapular retraction, shld shrugs





- Charges


Timed Code Treatment Minutes: 38


Total Treatment Time: 38


Procedures billed for this date of service:: ex3, hp





Assessment


Assessment: Patient has demo improved ROM actively allowing for return of all 

ADLs and household tasks.  She has demo increased score for UE Functional Index 

as well from 8/80 or 90% impairment to now 63/80 or 22% impairment.


Patient Education: Education of diagnosis, Education of Plan of Care


Rehab Potential: Good





Short Term Goals


Goal #1: pt rate pain <8/10 with activity


Goal to be met by: 02/16/18


Progress towards Goal:: Met


Goal #2: Increase R shld flex 135 abd 115 ER 35, L shld flex 130, abd 90, ER 50


Goal to be met by: 02/16/18


Progress towards Goal:: Met


Goal #3: pt able to get dressed with decreased pain without assist


Goal to be met by: 02/16/18


Progress towards Goal:: Met





Long Term Goals


Goal #1: pt rate pain <6/10 B shlds


Goal to be met by: 03/01/18


Progress towards goal: Met


Goal #2: Increase ROM B shlds WFL's to allow for independence with ADL's


Goal to be met by: 03/01/18


Progress towards goal: Met


Goal #3: pt increased strength BUE 4- to 4/5 and independent with HEP.


Goal to be met by: 03/01/18 (pt is compliant with HEP, strength improving)


Progress towards goal: Met


Goal #4: Improvement on UE functional index to > 40


Goal to be met by: 03/01/18 (not tested this visit)


Progress towards goal: Met (63/80)

## 2018-03-26 NOTE — RS.CSNOTE
PT Case Note


Title of document: Medicaid Approval Request


Note: Patient: Feli Chavez.  Recipient Number: [id number].  Reference Number: 

[id number].  Our department received a Prior Approval Notification for this 

patient for 6 visits, our initial request was for 3 x a week for 3 weeks. We 

inadvertantly only requested 6 visits when it should have been 9 visits.  

Physical therapy was treating this patient following bilateral shoulder 

manipulations 2/8/18.  It was medically necessary for this patient to have 

adequate visits of skilled therapy, due to both upper extremities involved. PT 

was required to modify HEP as well as manual stretching and mobilization to 

improve ROM to bilateral shoulders. The improved ROM is required to allow 

patient to perform self care and ADL's independently.  I am asking that you 

consider covering the full 9 visits, as she received treatment that was 

medically necessary and did see significant improvement in ROM and decreased 

pain.  Thank you.  Nan Garcia PT.  Physical Therapist.  [facility]

## 2018-06-05 ENCOUNTER — HOSPITAL ENCOUNTER (OUTPATIENT)
Dept: HOSPITAL 58 - RAD | Age: 63
Discharge: HOME | End: 2018-06-05
Attending: INTERNAL MEDICINE

## 2018-06-05 VITALS — BODY MASS INDEX: 19.3 KG/M2

## 2018-06-05 DIAGNOSIS — Z12.31: Primary | ICD-10-CM

## 2018-06-05 PROCEDURE — 77067 SCR MAMMO BI INCL CAD: CPT

## 2018-06-06 NOTE — MAMMO
EXAM:  Digital screening mammogram with tomosynthesis 

  

HISTORY:  Screening 

  

COMPARISON:  05/30/2017 

  

FINDINGS:  Digital  MLO and CC views of the right and left breast were performed.  Tomosynthesis was 
performed.  Computer aided detection utilized.  There are scattered fibroglandular densities.  There 
is no evidence for mass, asymmetry, distortion, or suspicious calcifications in either breast. 

  

IMPRESSION: 

1.  No evidence of malignancy in the right or left breast. 

2.  Annual screening mammogram is recommended in one year. 

  

BIRADS category 1, negative examination

## 2018-08-21 ENCOUNTER — HOSPITAL ENCOUNTER (OUTPATIENT)
Dept: HOSPITAL 58 - RHC-LAB | Age: 63
Discharge: HOME | End: 2018-08-21
Attending: INTERNAL MEDICINE

## 2018-08-21 VITALS — BODY MASS INDEX: 19.3 KG/M2

## 2018-08-21 DIAGNOSIS — I10: ICD-10-CM

## 2018-08-21 DIAGNOSIS — E78.5: ICD-10-CM

## 2018-08-21 DIAGNOSIS — I50.32: ICD-10-CM

## 2018-08-21 DIAGNOSIS — E11.9: Primary | ICD-10-CM

## 2018-08-21 PROCEDURE — 80061 LIPID PANEL: CPT

## 2018-08-21 PROCEDURE — 36415 COLL VENOUS BLD VENIPUNCTURE: CPT

## 2018-08-21 PROCEDURE — 82043 UR ALBUMIN QUANTITATIVE: CPT

## 2018-08-21 PROCEDURE — 80053 COMPREHEN METABOLIC PANEL: CPT

## 2018-08-21 PROCEDURE — 83036 HEMOGLOBIN GLYCOSYLATED A1C: CPT

## 2018-08-21 PROCEDURE — 85025 COMPLETE CBC W/AUTO DIFF WBC: CPT

## 2018-08-21 PROCEDURE — 84443 ASSAY THYROID STIM HORMONE: CPT

## 2018-09-04 ENCOUNTER — HOSPITAL ENCOUNTER (OUTPATIENT)
Dept: HOSPITAL 58 - RHC-LAB | Age: 63
Discharge: HOME | End: 2018-09-04
Attending: INTERNAL MEDICINE
Payer: COMMERCIAL

## 2018-09-04 VITALS — BODY MASS INDEX: 19.3 KG/M2

## 2018-09-04 DIAGNOSIS — E78.5: ICD-10-CM

## 2018-09-04 DIAGNOSIS — E11.9: Primary | ICD-10-CM

## 2018-09-04 DIAGNOSIS — I50.32: ICD-10-CM

## 2018-09-04 DIAGNOSIS — I10: ICD-10-CM

## 2018-09-04 PROCEDURE — 83970 ASSAY OF PARATHORMONE: CPT

## 2018-09-04 PROCEDURE — 36415 COLL VENOUS BLD VENIPUNCTURE: CPT

## 2018-09-04 PROCEDURE — 80053 COMPREHEN METABOLIC PANEL: CPT

## 2018-09-04 PROCEDURE — 80162 ASSAY OF DIGOXIN TOTAL: CPT

## 2018-11-13 ENCOUNTER — HOSPITAL ENCOUNTER (OUTPATIENT)
Dept: HOSPITAL 58 - RHC-LAB | Age: 63
Discharge: HOME | End: 2018-11-13
Attending: NURSE PRACTITIONER

## 2018-11-13 VITALS — BODY MASS INDEX: 19.3 KG/M2

## 2018-11-13 DIAGNOSIS — E11.9: Primary | ICD-10-CM

## 2018-11-13 DIAGNOSIS — Z51.81: ICD-10-CM

## 2018-11-13 PROCEDURE — 83037 HB GLYCOSYLATED A1C HOME DEV: CPT

## 2018-11-13 PROCEDURE — 36415 COLL VENOUS BLD VENIPUNCTURE: CPT

## 2018-11-13 PROCEDURE — 80306 DRUG TEST PRSMV INSTRMNT: CPT

## 2019-01-31 ENCOUNTER — HOSPITAL ENCOUNTER (OUTPATIENT)
Dept: HOSPITAL 58 - RHC-LAB | Age: 64
Discharge: HOME | End: 2019-01-31
Attending: FAMILY MEDICINE
Payer: COMMERCIAL

## 2019-01-31 VITALS — BODY MASS INDEX: 19.3 KG/M2

## 2019-01-31 DIAGNOSIS — I50.32: ICD-10-CM

## 2019-01-31 DIAGNOSIS — E11.9: Primary | ICD-10-CM

## 2019-01-31 PROCEDURE — 80053 COMPREHEN METABOLIC PANEL: CPT

## 2019-01-31 PROCEDURE — 36415 COLL VENOUS BLD VENIPUNCTURE: CPT

## 2019-03-27 ENCOUNTER — HOSPITAL ENCOUNTER (OUTPATIENT)
Dept: HOSPITAL 58 - RHC-LAB | Age: 64
Discharge: HOME | End: 2019-03-27
Attending: FAMILY MEDICINE

## 2019-03-27 VITALS — BODY MASS INDEX: 19.3 KG/M2

## 2019-03-27 DIAGNOSIS — R05: Primary | ICD-10-CM

## 2019-03-27 PROCEDURE — 87502 INFLUENZA DNA AMP PROBE: CPT

## 2019-04-08 ENCOUNTER — HOSPITAL ENCOUNTER (OUTPATIENT)
Dept: HOSPITAL 58 - RAD | Age: 64
Discharge: HOME | End: 2019-04-08
Attending: NURSE PRACTITIONER

## 2019-04-08 VITALS — BODY MASS INDEX: 19.3 KG/M2

## 2019-04-08 DIAGNOSIS — J44.9: Primary | ICD-10-CM

## 2019-04-08 NOTE — DI
EXAM:  CHEST FRONTAL AND LATERAL VIEWS 

  

HISTORY:  Chronic obstructive pulmonary disease. 

COMPARISON:  12/16/2016 

  

FINDINGS:    Heart size and mediastinal contour remain within normal limits.  There is at least mild 
atherosclerotic disease.  Mild hyperinflation.  No acute infiltrates are seen.  No vascular congestio
n.  There is no consolidation, visible pleural fluid or pneumothorax.  Bones reveal no acute fracture
. 

  

IMPRESSION:    No acute cardiopulmonary process.

## 2019-06-11 ENCOUNTER — HOSPITAL ENCOUNTER (OUTPATIENT)
Dept: HOSPITAL 58 - RAD | Age: 64
Discharge: HOME | End: 2019-06-11
Attending: FAMILY MEDICINE

## 2019-06-11 VITALS — BODY MASS INDEX: 19.3 KG/M2

## 2019-06-11 DIAGNOSIS — Z12.31: Primary | ICD-10-CM

## 2019-06-12 NOTE — MAMMO
EXAM:  Bilateral digital screening mammogram (2-D and 3-D) 

  

History:  Screening 

  

Comparison:  Bilateral mammogram 06/05/2018 

  

Findings:  MLO and CC views of bilateral breasts demonstrate scattered fibroglandular breast parenchy
ma.  CAD was reviewed by the radiologist.  Tomosynthesis was performed.  There are no dominant masses
, no suspicious microcalcifications and no architectural distortions.  Stable benign bilateral breast
 calcifications. 

  

Impression:  Benign stable mammogram.  Recommend followup routine screening mammography in 1 year. 

  

BI-RADS 2, benign

## 2019-08-26 ENCOUNTER — HOSPITAL ENCOUNTER (OUTPATIENT)
Dept: HOSPITAL 58 - RHC-LAB | Age: 64
Discharge: HOME | End: 2019-08-26
Attending: FAMILY MEDICINE

## 2019-08-26 VITALS — BODY MASS INDEX: 19.3 KG/M2

## 2019-08-26 DIAGNOSIS — Z51.81: Primary | ICD-10-CM

## 2019-08-26 PROCEDURE — 80306 DRUG TEST PRSMV INSTRMNT: CPT
